# Patient Record
Sex: MALE | Race: WHITE | Employment: OTHER | URBAN - METROPOLITAN AREA
[De-identification: names, ages, dates, MRNs, and addresses within clinical notes are randomized per-mention and may not be internally consistent; named-entity substitution may affect disease eponyms.]

---

## 2023-09-27 ENCOUNTER — OFFICE VISIT (OUTPATIENT)
Dept: FAMILY MEDICINE CLINIC | Facility: CLINIC | Age: 68
End: 2023-09-27
Payer: MEDICARE

## 2023-09-27 VITALS
OXYGEN SATURATION: 96 % | TEMPERATURE: 97 F | HEART RATE: 62 BPM | DIASTOLIC BLOOD PRESSURE: 80 MMHG | SYSTOLIC BLOOD PRESSURE: 126 MMHG | RESPIRATION RATE: 18 BRPM

## 2023-09-27 DIAGNOSIS — Z11.52 ENCOUNTER FOR SCREENING FOR COVID-19: Primary | ICD-10-CM

## 2023-09-27 PROCEDURE — 99202 OFFICE O/P NEW SF 15 MIN: CPT | Performed by: NURSE PRACTITIONER

## 2023-09-27 PROCEDURE — 87635 SARS-COV-2 COVID-19 AMP PRB: CPT | Performed by: NURSE PRACTITIONER

## 2023-09-28 LAB — SARS-COV-2 RNA RESP QL NAA+PROBE: NOT DETECTED

## 2024-12-17 ENCOUNTER — LAB ENCOUNTER (OUTPATIENT)
Dept: LAB | Age: 69
End: 2024-12-17
Attending: INTERNAL MEDICINE
Payer: MEDICARE

## 2024-12-17 ENCOUNTER — TELEPHONE (OUTPATIENT)
Dept: NEUROLOGY | Facility: CLINIC | Age: 69
End: 2024-12-17

## 2024-12-17 ENCOUNTER — OFFICE VISIT (OUTPATIENT)
Dept: NEUROLOGY | Facility: CLINIC | Age: 69
End: 2024-12-17
Payer: MEDICARE

## 2024-12-17 DIAGNOSIS — E83.10 DISORDER OF IRON METABOLISM, UNSPECIFIED: ICD-10-CM

## 2024-12-17 DIAGNOSIS — I67.81 ACUTE CEREBROVASCULAR INSUFFICIENCY: ICD-10-CM

## 2024-12-17 DIAGNOSIS — R20.0 NUMBNESS AND TINGLING IN BOTH HANDS: ICD-10-CM

## 2024-12-17 DIAGNOSIS — R68.89 FORGETFULNESS: ICD-10-CM

## 2024-12-17 DIAGNOSIS — G25.81 RESTLESS LEGS SYNDROME: ICD-10-CM

## 2024-12-17 DIAGNOSIS — R20.0 NUMBNESS AND TINGLING IN BOTH HANDS: Primary | ICD-10-CM

## 2024-12-17 DIAGNOSIS — R20.2 NUMBNESS AND TINGLING IN BOTH HANDS: ICD-10-CM

## 2024-12-17 DIAGNOSIS — R20.2 NUMBNESS AND TINGLING IN BOTH HANDS: Primary | ICD-10-CM

## 2024-12-17 DIAGNOSIS — G44.85 PRIMARY STABBING HEADACHE: ICD-10-CM

## 2024-12-17 LAB
ALBUMIN SERPL-MCNC: 4.2 G/DL (ref 3.2–4.8)
ALBUMIN/GLOB SERPL: 1.4 {RATIO} (ref 1–2)
ALP LIVER SERPL-CCNC: 66 U/L
ALT SERPL-CCNC: 20 U/L
ANION GAP SERPL CALC-SCNC: 6 MMOL/L (ref 0–18)
AST SERPL-CCNC: 15 U/L (ref ?–34)
BASOPHILS # BLD AUTO: 0.06 X10(3) UL (ref 0–0.2)
BASOPHILS NFR BLD AUTO: 0.8 %
BILIRUB SERPL-MCNC: 0.7 MG/DL (ref 0.2–1.1)
BUN BLD-MCNC: 20 MG/DL (ref 9–23)
BUN/CREAT SERPL: 25.3 (ref 10–20)
CALCIUM BLD-MCNC: 9.6 MG/DL (ref 8.7–10.4)
CHLORIDE SERPL-SCNC: 106 MMOL/L (ref 98–112)
CHOLEST SERPL-MCNC: 203 MG/DL (ref ?–200)
CO2 SERPL-SCNC: 28 MMOL/L (ref 21–32)
CREAT BLD-MCNC: 0.79 MG/DL
DEPRECATED RDW RBC AUTO: 44.7 FL (ref 35.1–46.3)
EGFRCR SERPLBLD CKD-EPI 2021: 96 ML/MIN/1.73M2 (ref 60–?)
EOSINOPHIL # BLD AUTO: 0.16 X10(3) UL (ref 0–0.7)
EOSINOPHIL NFR BLD AUTO: 2.1 %
ERYTHROCYTE [DISTWIDTH] IN BLOOD BY AUTOMATED COUNT: 12.9 % (ref 11–15)
EST. AVERAGE GLUCOSE BLD GHB EST-MCNC: 105 MG/DL (ref 68–126)
FASTING PATIENT LIPID ANSWER: NO
FASTING STATUS PATIENT QL REPORTED: NO
GLOBULIN PLAS-MCNC: 2.9 G/DL (ref 2–3.5)
GLUCOSE BLD-MCNC: 66 MG/DL (ref 70–99)
HBA1C MFR BLD: 5.3 % (ref ?–5.7)
HCT VFR BLD AUTO: 44.6 %
HDLC SERPL-MCNC: 48 MG/DL (ref 40–59)
HGB BLD-MCNC: 15.5 G/DL
IMM GRANULOCYTES # BLD AUTO: 0.03 X10(3) UL (ref 0–1)
IMM GRANULOCYTES NFR BLD: 0.4 %
LDLC SERPL CALC-MCNC: 137 MG/DL (ref ?–100)
LYMPHOCYTES # BLD AUTO: 1.02 X10(3) UL (ref 1–4)
LYMPHOCYTES NFR BLD AUTO: 13.1 %
MCH RBC QN AUTO: 32.7 PG (ref 26–34)
MCHC RBC AUTO-ENTMCNC: 34.8 G/DL (ref 31–37)
MCV RBC AUTO: 94.1 FL
MONOCYTES # BLD AUTO: 0.59 X10(3) UL (ref 0.1–1)
MONOCYTES NFR BLD AUTO: 7.6 %
NEUTROPHILS # BLD AUTO: 5.9 X10 (3) UL (ref 1.5–7.7)
NEUTROPHILS # BLD AUTO: 5.9 X10(3) UL (ref 1.5–7.7)
NEUTROPHILS NFR BLD AUTO: 76 %
NONHDLC SERPL-MCNC: 155 MG/DL (ref ?–130)
OSMOLALITY SERPL CALC.SUM OF ELEC: 291 MOSM/KG (ref 275–295)
PLATELET # BLD AUTO: 222 10(3)UL (ref 150–450)
POTASSIUM SERPL-SCNC: 4.7 MMOL/L (ref 3.5–5.1)
PROT SERPL-MCNC: 7.1 G/DL (ref 5.7–8.2)
RBC # BLD AUTO: 4.74 X10(6)UL
SODIUM SERPL-SCNC: 140 MMOL/L (ref 136–145)
TRIGL SERPL-MCNC: 100 MG/DL (ref 30–149)
TSI SER-ACNC: 2.48 UIU/ML (ref 0.55–4.78)
VIT B12 SERPL-MCNC: 748 PG/ML (ref 211–911)
VLDLC SERPL CALC-MCNC: 18 MG/DL (ref 0–30)
WBC # BLD AUTO: 7.8 X10(3) UL (ref 4–11)

## 2024-12-17 PROCEDURE — 83036 HEMOGLOBIN GLYCOSYLATED A1C: CPT

## 2024-12-17 PROCEDURE — 84443 ASSAY THYROID STIM HORMONE: CPT

## 2024-12-17 PROCEDURE — 85025 COMPLETE CBC W/AUTO DIFF WBC: CPT

## 2024-12-17 PROCEDURE — 80061 LIPID PANEL: CPT

## 2024-12-17 PROCEDURE — 80053 COMPREHEN METABOLIC PANEL: CPT

## 2024-12-17 PROCEDURE — 36415 COLL VENOUS BLD VENIPUNCTURE: CPT

## 2024-12-17 PROCEDURE — 82607 VITAMIN B-12: CPT

## 2024-12-17 RX ORDER — CALCIUM CARBONATE/VITAMIN D3 600 MG-10
250 TABLET ORAL DAILY
COMMUNITY

## 2024-12-17 RX ORDER — ACETAMINOPHEN 325 MG/1
325 TABLET ORAL EVERY 6 HOURS PRN
COMMUNITY

## 2024-12-17 NOTE — PROGRESS NOTES
[facility] NEUROSCIENCES [address], [address]  [facility] [id number]  [phone]            Neurology Initial Visit     Referred By: Dr. Barraza ref. provider found    Chief Complaint:   Chief Complaint   Patient presents with    Neurologic Problem     NPT Patient presents here today to establish care, (patient does not have a PCP). Patient c/o mary legs, feet and hands numbness also sharp headaches with loss of memory changes with worsening.        HPI:     lEder Stevens is a 69 year old male, who presents for evaluation of numbness, headaches.  Has been having stabbing headaches in different locations for many years, will happen in 1 spot lasting about 10 seconds.  If he takes a few deep breaths and relaxes it will ease up.  Usually happens about once a week.  Also will have separate pain over his bilateral temples at times.    Also complaining of bilateral hand tingling which happens a lot during the daytime if he is holding his phone or using his hands in certain positions.  Hand tingling significantly flares up when he is sleeping at night.  Describes it as being equal in both hands, pain and tingling both which goes up into the arms.    Also having some tingling in the feet.  Happens only with he is sitting and resting and if he wiggles the feet it will subside.  Feels it in both feet involving the entire foot.  Has history of chronic diarrhea for 30 years.  Had colonoscopy about 30 years ago which showed polyps and he was also told to adjust his diet.  Has never gotten better and has gotten worse to the point where he has to wear diapers.  Has not seen a doctor in several decades, no cancer screening in the last 10-20 years, or screening for other illnesses.  Next    Also reporting some forgetful behavior.  Managing fine at home with bills and daily activities.  Lives with his daughter.  Sleep is overall okay.  Has significant anxiety some of which is related to financial concerns.   Drives, does not get lost, not repeating conversations.  Loses objects but not any more so than most others.    No past medical history on file.    No past surgical history on file.    Social history:  History   Smoking Status    Not on file   Smokeless Tobacco    Not on file     History   Alcohol use Not on file     History   Drug Use Not on file       No family history on file.      Current Outpatient Medications:     acetaminophen 325 MG Oral Tab, Take 1 tablet (325 mg total) by mouth every 6 (six) hours as needed for Pain., Disp: , Rfl:     cyanocobalamin 250 MCG Oral Tab, Take 1 tablet (250 mcg total) by mouth daily., Disp: , Rfl:     Allergies[1]    ROS:   As in HPI, the rest of the 14 system review was done and was negative      Physical Exam:  There were no vitals filed for this visit.    General: No apparent distress, well nourished, well groomed.  Head- Normocephalic, atraumatic  Eyes- No redness or swelling    Neurological:   Mental Status:  Mental Status- Alert, conversant, speech fluent, following all commands, Fund of knowledge appropriate for education and age, and Thought process intact    Cranial Nerves:  II.- Visual fields full to confrontation, III, IV, VI- EOM intact, V. Facial sensation intact, and VII. Face symmetric, no facial weakness    Motor Exam:  Strength- upper extremities 5/5 proximally and distally                - lower  extremities 5/5 proximally and distally    Sensory Exam:  Temperatyre- intact in all 4 extremities  Vibration- intact in all 4 extremities    Deep Tendon Reflexes:  Biceps 2+ bilateral symmetric  Triceps 2+ bilateral symmetric  Brachioradialis 2 + bilateral symmetric  Patellar 2+ bilateral symmetric  Ankle jerks 2+ bilateral symmetric    Coordination:  No dysmetria with finger to nose bilaterally    Gait:  Normal casual gait    Labs:    No results found for: \"TSH\"  No results found for: \"CHOL\", \"HDL\", \"LDL\", \"TRIG\"  No results found for: \"HGB\", \"HCT\", \"MCV\", \"WBC\",  \"ALC\", \"PLT\"   No results found for: \"GLUCOSE\", \"BUN\", \"CREAT\", \"CA\", \"ALT\", \"AST\", \"ALKPHOS\", \"ALB\", \"NA\", \"K\", \"CL\", \"CO2\"   I have reviewed labs.    Imaging Studies:  None        Assessment   Elder Stevens is a 69 year old male with no known PMH, who presents for evaluation of numbness, forgetfulness, and headaches.    1. Numbness and tingling in both hands  -Likely carpal tunnel, suggested to try nightly wrist splints  - EMG (at [facility]); Future  - A1c, TSH W REFLEX TO FREE T4 [85826][Q]; Future    2. Primary stabbing headache  -Very chronic, likely benign  - MRI BRAIN (W+WO) (CPT=70553); Future    3. Foot numbness- Likely Restless legs syndrome  - Check ferritin, especially given chronic diarrhea  - CBC W Differential W Platelet [E]; Future  - Comp Metabolic Panel (14) [E]; Future    4. Forgetfulness  -May be due to anxiety or undiagnosed systemic illness  - Vitamin B12 [E]; Future, Lipid panel  - MRI BRAIN (W+WO) (CPT=70553); Future    Gave patient several names for primary care physicians to call to establish care, stressed importance of establishing with primary care for regular screening and especially if above labs are abnormal.         Education and counseling provided to patient. Instructed patient to call my office or seek medical attention immediately if symptoms worsen.  Patient verbalized understanding of information given. All questions were answered. All side effects of drugs were discussed.     I have spent 52 min total time on the day of the encounter, including: Total time spent reasons:  Preparing to see the patient, Obtaining and/or reviewing separately obtained history, Performing a medically appropriate examination and/or evaluation, Counseling and educating the patient/family/caregiver, Ordering medications, tests, or procedures, and Documenting clinical information in Epic      Return to clinic in: Return in about 3 months (around 3/17/2025).    Penelope Archer MD         [1] No Known  Allergies

## 2024-12-17 NOTE — TELEPHONE ENCOUNTER
Pt daughter and pt called in wanting to get 2 limb emg done. Scheduled for next available April of 2025. Pls advise if okay to wait until April 2025 or if ok;d to dbl book pt to come in sooner.

## 2024-12-18 NOTE — PROGRESS NOTES
Can you please call and let him know his labs were normal except for cholesterol which is mildly elevated? For cholesterol, recommend working on improving diet- incorporate lean meat, veggies, avoid fried food, cheeses, fast food.  Also increase exercise. Can reassess when he establishes with a PCP.  Diabetes screening was negative.

## 2025-01-13 ENCOUNTER — PROCEDURE VISIT (OUTPATIENT)
Dept: NEUROLOGY | Facility: CLINIC | Age: 70
End: 2025-01-13
Payer: MEDICARE

## 2025-01-13 DIAGNOSIS — R20.2 NUMBNESS AND TINGLING IN BOTH HANDS: ICD-10-CM

## 2025-01-13 DIAGNOSIS — R20.0 NUMBNESS AND TINGLING IN BOTH HANDS: ICD-10-CM

## 2025-01-13 NOTE — PROCEDURES
[address]  [address]  [place] [address]  Phone: [phone]  Fax: [phone]    ELECTRODIAGNOSTIC REPORT          Patient: Elder Stevens Hand Dominance:  Right   Patient ID: [id number] Referring Dr:      Sex: Male Test Dr:  MD Gianni.   YOB: 1955           Visit Date: 1/13/2025 2:46 PM Examining MD:     Age: 69 Years Referred by:     Height:   Weight:     Referred for:                 Summary    The motor conduction test was performed on 4 nerve(s). The results were normal in 3 nerve(s): L Median - APB, R Ulnar - ADM, L Ulnar - ADM. Results outside the specified normal range were found in 1 nerve(s), as follows:  In the R Median - APB study  the latency was increased for Wrist stimulation    The sensory conduction test was performed on 3 nerve(s). The results were normal in 1 nerve(s): R Median, Ulnar - Transcarpal comparison. Results outside the specified normal range were found in 2 nerve(s), as follows:  In the L Median, Ulnar - Transcarpal comparison study  the peak latency difference was increased for Median Palm - Ulnar Palm segment  In the R Median, Radial - Thumb comparison study  the peak latency difference was increased for Median Wrist - Radial Wrist segment    The needle EMG examination was performed in 9 muscles. It was normal in 5 muscle(s): R. Deltoid, R. First dorsal interosseous, L. Deltoid, L. First dorsal interosseous, L. Extensor indicis proprius. The study was abnormal in 4 muscle(s), with the following distribution:  The MUP waveform abnormality was found in R. Triceps brachii, R. Flexor carpi radialis, L. Triceps brachii, L. Flexor carpi radialis.          Conclusion:       ABNORMAL STUDY      There is electrophysiologic evidence of bilateral median mononeuropathies at the wrists, which are  mild in severity. This is clinically consistent with bilateral carpal tunnel syndrome.  There is additional evidence of bilateral chronic,  mild C7 radiculopathies with no active denervation seen.    ________________________________  Penelope Archer M.D., FAAN               Motor Nerve Conduction Study       Nerve / Sites Muscle Latency Amplitude Amp Dur. Segments Distance Lat Diff Velocity     ms mV % ms  cm ms m/s   R Median - APB      Wrist APB 4.77 7.6  6.5 Wrist - APB 8        Ref.  <=4.70 >=3.8   Ref.         Elbow APB 9.15 7.5 98.3 6.3 Elbow - Wrist 22 4.38 50      Ref.      Ref.   >=47   L Median - APB      Wrist APB 4.33 7.8  5.4 Wrist - APB 8        Ref.  <=4.70 >=3.8   Ref.         Elbow APB 8.40 7.1 90.3 5.8 Elbow - Wrist 20.5 4.06 50      Ref.      Ref.   >=47   R Ulnar - ADM      Wrist ADM 2.94 8.0 100 6.9 Wrist - ADM 8        Ref.  <=3.70 >=7.9   Ref.         B.Elbow ADM 6.21 6.2 77.8 7.6 B.Elbow - Wrist 19 3.27 58      Ref.      Ref.   >=52      A.Elbow ADM 8.52 5.9 94.3 7.4 A.Elbow - B.Elbow 10 2.31 43      Ref.      Ref.   >=43   L Ulnar - ADM      Wrist ADM 3.04 8.1 100 6.5 Wrist - ADM 8        Ref.  <=3.70 >=7.9   Ref.         B.Elbow ADM 6.40 7.0 87.1 7.2 B.Elbow - Wrist 18.2 3.35 54      Ref.      Ref.   >=52      A.Elbow ADM 8.75 6.1 87.1 7.3 A.Elbow - B.Elbow 11 2.35 47      Ref.      Ref.   >=43       Sensory Nerve Conduction Study       Nerve / Sites Rec. Site Onset Lat Peak Lat NP Amp PP Amp Segments Distance Peak Diff Velocity     ms ms µV µV  cm ms m/s   R Median, Ulnar - Transcarpal comparison      Median Palm Wrist 2.08 2.66 66.4 89.9 Median Palm - Wrist 8  38      Ulnar Palm Wrist 1.67 2.34 24.6 7.2 Ulnar Palm - Wrist 8  48         Median Palm - Ulnar Palm  0.31          Ref.  <=0.40    L Median, Ulnar - Transcarpal comparison      Median Palm Wrist 1.93 2.60 70.4 59.4 Median Palm - Wrist 8  42      Ulnar Palm Wrist 1.61 2.14 13.4 19.8 Ulnar Palm - Wrist 8  50         Median Palm - Ulnar Palm  0.47          Ref.  <=0.40    R Median, Radial - Thumb comparison      Median Wrist Thumb 3.33 4.11 20.3 34.5 Median Wrist - Thumb 10   30      Radial Wrist Thumb 2.76 3.33 5.4 23.9 Radial Wrist - Thumb 10  36         Median Wrist - Radial Wrist  0.78          Ref.  <=0.50        EMG Summary Table     Spontaneous MUAP Recruitment   Muscle Nerve Roots IA Fib PSW Fasc H.F. Comments Amp Dur. PPP Pattern   R. Deltoid Axillary C5-C6 N None None None None Normal N N N N   R. Triceps brachii Radial C6-C8 N None None None None Normal N 1+ 1+ N   R. Flexor carpi radialis Median C6-C7 N None None None None Normal N 1+ 1+ N   R. First dorsal interosseous Ulnar C8-T1 N None None None None Normal N N N N   L. Deltoid Axillary C5-C6 N None None None None Normal N N N N   L. Triceps brachii Radial C6-C8 N None None None None Normal N 1+ 1+ N   L. Flexor carpi radialis Median C6-C7 N None None None None Normal 3-5 1+ N N   L. First dorsal interosseous Ulnar C8-T1 N None None None None Normal N N N N   L. Extensor indicis proprius Radial C7-C8 N None None None None Normal N N N N

## 2025-01-15 ENCOUNTER — HOSPITAL ENCOUNTER (OUTPATIENT)
Dept: MRI IMAGING | Age: 70
Discharge: HOME | End: 2025-01-15
Attending: INTERNAL MEDICINE
Payer: MEDICARE

## 2025-01-15 DIAGNOSIS — R68.89 FORGETFULNESS: ICD-10-CM

## 2025-01-15 DIAGNOSIS — G44.85 PRIMARY STABBING HEADACHE: ICD-10-CM

## 2025-01-15 PROCEDURE — 70553 MRI BRAIN STEM W/O & W/DYE: CPT | Performed by: INTERNAL MEDICINE

## 2025-01-15 PROCEDURE — A9575 INJ GADOTERATE MEGLUMI 0.1ML: HCPCS | Performed by: INTERNAL MEDICINE

## 2025-01-15 RX ORDER — GADOTERATE MEGLUMINE 376.9 MG/ML
14 INJECTION INTRAVENOUS
Status: COMPLETED | OUTPATIENT
Start: 2025-01-15 | End: 2025-01-15

## 2025-01-15 RX ADMIN — GADOTERATE MEGLUMINE 14 ML: 376.9 INJECTION INTRAVENOUS at 17:49:00

## 2025-01-17 DIAGNOSIS — D35.2 PITUITARY MACROADENOMA (HCC): Primary | ICD-10-CM

## 2025-01-17 NOTE — PROGRESS NOTES
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  This is a benign lesion that can grow in the brain, but if it gets large enough, can sometimes cause vision or hormonal changes.  We need to refer him to a neurosurgeon and ophthalmologist for further evaluation.  Also need to obtain some blood work to see if there are any hormonal changes as a result of the lesion. If those are abnormal he would need to see an endocrinologist.      As of our last visit, I do not think he had a PCP so could you also help him to find a PCP?  Will place all the referral orders right now.  Thanks!

## 2025-01-17 NOTE — PROGRESS NOTES
I also ordered a dedicated pituitary MRI.  Please stress that this is not a tumor, and may be an incidental finding.  Its not clear whether this is related to his headaches or not.

## 2025-01-20 ENCOUNTER — PATIENT MESSAGE (OUTPATIENT)
Dept: NEUROLOGY | Facility: CLINIC | Age: 70
End: 2025-01-20

## 2025-01-20 DIAGNOSIS — G56.01 CARPAL TUNNEL SYNDROME OF RIGHT WRIST: Primary | ICD-10-CM

## 2025-01-20 PROCEDURE — L3908 WHO COCK-UP NONMOLDE PRE OTS: HCPCS | Performed by: INTERNAL MEDICINE

## 2025-01-20 NOTE — TELEPHONE ENCOUNTER
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  This is a benign lesion that can grow in the brain, but if it gets large enough, can sometimes cause vision or hormonal changes.  We need to   refer him to a neurosurgeon and ophthalmologist for further evaluation.  Also need to obtain some blood work to see if there are any hormonal changes as a result of the lesion. If those are abnormal he would need to see an endocrinologist.      As of our last visit, I do not think he had a PCP so could you also help him to find a PCP?  Will place all the referral orders right now.  Thanks!

## 2025-01-20 NOTE — TELEPHONE ENCOUNTER
I called the patient and let him know of the result. Also sent him a [employer] messaging with the in the information. Patient understood and I will go ahead and schedule his appointments.    Patient did ask for a prescription for wrist splint so that his insurance can cover it.

## 2025-01-22 ENCOUNTER — HOSPITAL ENCOUNTER (OUTPATIENT)
Dept: MRI IMAGING | Age: 70
Discharge: HOME | End: 2025-01-22
Attending: INTERNAL MEDICINE
Payer: MEDICARE

## 2025-01-22 DIAGNOSIS — D35.2 PITUITARY MACROADENOMA (HCC): ICD-10-CM

## 2025-01-22 PROCEDURE — 70553 MRI BRAIN STEM W/O & W/DYE: CPT | Performed by: INTERNAL MEDICINE

## 2025-01-22 PROCEDURE — A9575 INJ GADOTERATE MEGLUMI 0.1ML: HCPCS | Performed by: INTERNAL MEDICINE

## 2025-01-22 RX ORDER — GADOTERATE MEGLUMINE 376.9 MG/ML
13 INJECTION INTRAVENOUS
Status: COMPLETED | OUTPATIENT
Start: 2025-01-22 | End: 2025-01-22

## 2025-01-22 RX ADMIN — GADOTERATE MEGLUMINE 13 ML: 376.9 INJECTION INTRAVENOUS at 19:35:00

## 2025-01-23 NOTE — PROGRESS NOTES
Can you call him and remind him to make neurosurgery appt for the pituitary mass? Was redemonstrated on MRI pituitary.  Thanks!

## 2025-03-11 ENCOUNTER — OFFICE VISIT (OUTPATIENT)
Dept: NEUROLOGY | Facility: CLINIC | Age: 70
End: 2025-03-11
Payer: MEDICARE

## 2025-03-11 ENCOUNTER — LAB ENCOUNTER (OUTPATIENT)
Dept: LAB | Facility: HOSPITAL | Age: 70
End: 2025-03-11
Attending: INTERNAL MEDICINE
Payer: MEDICARE

## 2025-03-11 VITALS — HEART RATE: 64 BPM | DIASTOLIC BLOOD PRESSURE: 77 MMHG | OXYGEN SATURATION: 97 % | SYSTOLIC BLOOD PRESSURE: 124 MMHG

## 2025-03-11 DIAGNOSIS — D35.2 PITUITARY ADENOMA (HCC): ICD-10-CM

## 2025-03-11 DIAGNOSIS — D50.8 IRON DEFICIENCY ANEMIA SECONDARY TO INADEQUATE DIETARY IRON INTAKE: Primary | ICD-10-CM

## 2025-03-11 DIAGNOSIS — D50.8 IRON DEFICIENCY ANEMIA SECONDARY TO INADEQUATE DIETARY IRON INTAKE: ICD-10-CM

## 2025-03-11 LAB
DEPRECATED HBV CORE AB SER IA-ACNC: 84 NG/ML
FSH SERPL-ACNC: 7.7 MIU/ML
IRON SATN MFR SERPL: 59 %
IRON SERPL-MCNC: 134 UG/DL
LH SERPL-ACNC: 3.7 MIU/ML
PROLACTIN SERPL-MCNC: 7.6 NG/ML
TOTAL IRON BINDING CAPACITY: 228 UG/DL (ref 250–425)
TRANSFERRIN SERPL-MCNC: 161 MG/DL (ref 215–365)

## 2025-03-11 PROCEDURE — 83001 ASSAY OF GONADOTROPIN (FSH): CPT

## 2025-03-11 PROCEDURE — 84466 ASSAY OF TRANSFERRIN: CPT

## 2025-03-11 PROCEDURE — G2211 COMPLEX E/M VISIT ADD ON: HCPCS | Performed by: INTERNAL MEDICINE

## 2025-03-11 PROCEDURE — 84305 ASSAY OF SOMATOMEDIN: CPT

## 2025-03-11 PROCEDURE — 82728 ASSAY OF FERRITIN: CPT

## 2025-03-11 PROCEDURE — 99214 OFFICE O/P EST MOD 30 MIN: CPT | Performed by: INTERNAL MEDICINE

## 2025-03-11 PROCEDURE — 83002 ASSAY OF GONADOTROPIN (LH): CPT

## 2025-03-11 PROCEDURE — 36415 COLL VENOUS BLD VENIPUNCTURE: CPT

## 2025-03-11 PROCEDURE — 83540 ASSAY OF IRON: CPT

## 2025-03-11 PROCEDURE — 84146 ASSAY OF PROLACTIN: CPT

## 2025-03-11 NOTE — PROGRESS NOTES
[facility] NEUROSCIENCES [address], [address]  [facility] [address]  [phone]            Neurology follow-up visit     Referred By: Dr. Barraza ref. provider found    Chief Complaint:   Chief Complaint   Patient presents with    Neurologic Problem     LOV 12/17/24  3 month follow up.       HPI:     Elder Stevens is a 69 year old male, who presents for follow-up of numbness, headaches.  Both are unchanged.  Had EMG where he was diagnosed with carpal tunnel but has not yet started splints for the hands.  Repositioning does seem to help relieve the pain.  Had MRI done showing pituitary adenoma but has not yet seen neurosurgery or Optho.  Denies any vision changes.    Memory is doing a little bit better, he has been working on stress management techniques which seems to be helping.    History reviewed. No pertinent past medical history.    History reviewed. No pertinent surgical history.    Social history:  History   Smoking Status    Unknown   Smokeless Tobacco    Not on file     History   Alcohol use Not on file     History   Drug Use Not on file       History reviewed. No pertinent family history.      Current Outpatient Medications:     acetaminophen 325 MG Oral Tab, Take 1 tablet (325 mg total) by mouth every 6 (six) hours as needed for Pain., Disp: , Rfl:     cyanocobalamin 250 MCG Oral Tab, Take 1 tablet (250 mcg total) by mouth daily., Disp: , Rfl:     Allergies[1]    ROS:   As in HPI, the rest of the 14 system review was done and was negative      Physical Exam:  Vitals:    03/11/25 1114   BP: 124/77   Pulse: 64   SpO2: 97%       General: No apparent distress, well nourished, well groomed.  Head- Normocephalic, atraumatic  Eyes- No redness or swelling    Neurological:   Mental Status:  Mental Status- Alert, conversant, speech fluent, following all commands, Fund of knowledge appropriate for education and age, and Thought process intact    Cranial Nerves:  II.- Visual fields full to  confrontation, III, IV, VI- EOM intact, V. Facial sensation intact, and VII. Face symmetric, no facial weakness    Motor Exam:  Strength- upper extremities 5/5 proximally and distally                - lower  extremities 5/5 proximally and distally    Sensory Exam:  Light touch- intact    Deep Tendon Reflexes:  Biceps 2+ bilateral symmetric  Triceps 2+ bilateral symmetric  Brachioradialis 2 + bilateral symmetric  Patellar 2+ bilateral symmetric  Ankle jerks 2+ bilateral symmetric    Coordination:  No dysmetria with finger to nose bilaterally    Gait:  Normal casual gait    Labs:    Lab Results   Component Value Date    TSH 2.476 12/17/2024     Lab Results   Component Value Date    HDL 48 12/17/2024     (H) 12/17/2024    TRIG 100 12/17/2024     Lab Results   Component Value Date    HGB 15.5 12/17/2024    HCT 44.6 12/17/2024    MCV 94.1 12/17/2024    WBC 7.8 12/17/2024    .0 12/17/2024      Lab Results   Component Value Date    BUN 20 12/17/2024    CA 9.6 12/17/2024    ALT 20 12/17/2024    AST 15 12/17/2024    ALB 4.2 12/17/2024     12/17/2024    K 4.7 12/17/2024     12/17/2024    CO2 28.0 12/17/2024      I have reviewed labs.    Imaging Studies:  MRI brain reviewed        Assessment   Elder Stevens is a 69 year old male with no known PMH, who presents for follow up of numbness, forgetfulness, and headaches.    1. Numbness and tingling in both hands  -EMG confirmed CTS  -Encouraged use of wrist splints    2. Primary stabbing headache  -Stable    3. Foot numbness- Likely Restless legs syndrome  - Check ferritin, TIBC, iron levels  - CBC showed no anemia  - Consider gabapentin if iron studies are normal.     4. Pituitary adenoma (HCC)  - Incidental.  Check labs as below, refer to nsgy  - FSH; Future  - IGF-1; Future  - LH (Luteinizing Hormone); Future  - Prolactin; Future  - Neurosurgery Referral - In Network       Education and counseling provided to patient. Instructed patient to call my  office or seek medical attention immediately if symptoms worsen.  Patient verbalized understanding of information given. All questions were answered. All side effects of drugs were discussed.     I have spent 31 min total time on the day of the encounter, including: Total time spent reasons:  Preparing to see the patient, Obtaining and/or reviewing separately obtained history, Performing a medically appropriate examination and/or evaluation, Counseling and educating the patient/family/caregiver, Ordering medications, tests, or procedures, and Documenting clinical information in Epic      Return to clinic in: Return in about 3 months (around 6/11/2025).    Penelope Archer MD         [1] No Known Allergies

## 2025-03-12 ENCOUNTER — OFFICE VISIT (OUTPATIENT)
Dept: SURGERY | Facility: CLINIC | Age: 70
End: 2025-03-12
Payer: MEDICARE

## 2025-03-12 DIAGNOSIS — D35.2 PITUITARY ADENOMA (HCC): Primary | ICD-10-CM

## 2025-03-12 PROCEDURE — 99203 OFFICE O/P NEW LOW 30 MIN: CPT | Performed by: NEUROLOGICAL SURGERY

## 2025-03-12 NOTE — PROGRESS NOTES
States he is having vision. States he having no other issues states he has dry skin    Reviewing imaging

## 2025-03-12 NOTE — H&P
Neurosurgery Clinic Visit  3/12/2025    Elder Stevens PCP:  None Pcp    1955 MRN [id number]       CHIEF COMPLAINT:  Chief Complaint   Patient presents with    New Patient     Pituitary adenoma       HISTORY OF PRESENT ILLNESS:  Elder Stevens is a(n) 69 year old male who presents for pituitary lesion.  Patient states he was having headaches.  He saw his PCP.  He was sent for an MRI.  MRI showed incidentally pituitary mass.  She then got an MRI of the pituitary.  He he denies any symptoms from a hormonal imbalance, he has had no visual changes.  He has not seen ophthalmology.  Is not seen endocrinology.  Overall he was doing pretty well other than his headaches.  He presents for evaluation.    REVIEW OF SYSTEMS:  The 12 point checklist was reviewed and was negative except: See above    ALLERGIES:  Allergies[1]    MEDICATIONS:  Current Outpatient Medications   Medication Sig Dispense Refill    acetaminophen 325 MG Oral Tab Take 1 tablet (325 mg total) by mouth every 6 (six) hours as needed for Pain.      cyanocobalamin 250 MCG Oral Tab Take 1 tablet (250 mcg total) by mouth daily.         HISTORY:  History reviewed. No pertinent past medical history.  History reviewed. No pertinent surgical history.  History reviewed. No pertinent family history.  Elder      PHYSICAL EXAMINATION:  Vital Signs:  There were no vitals taken for this visit.  General: The patient is in no acute distress.  HEENT: The head is atraumatic and normocephalic.  The eyes, ears, nose and throat are clear. The pupils are equal, round, and reactive to light.  Hearing is intact and symmetric.  Muscular:  Exam reveals normal bulk and tone.  Neurologic Exam: The patient is oriented to time, person and place.  Memory, attention span, language findings, and knowledge and insight into the problem appear intact and appropriate.  Cranial nerve Exam:  Visual fields are full.  The pupils are equal, round, and reactive to light.  The extraocular  movements are intact.  Facial sensation in intact.  Facial symmetry and movement of the face is intact.  Hearing appears to be intact and symmetric.  Elevation of the palate appears symmetric as well.  Shoulder shrug is intact and symmetric.  The tongue is in the midline.    Strength:     Biceps Triceps Deltoids Wrist Extension Hand Intrinsics   Left 5/5 5/5 5/5 5/5 5/5   Right 5/5 5/5 5/5 5/5 5/5      Hip Flexion Hip Adduction Hip Abduction Knee Flexion Knee Extension Plantar- flexion Dorsi- flexion EHL   Left 5/5 5/5 5/5 5/5 5/5 5/5 5/5 5/5   Right 5/5 5/5 5/5 5/5 5/5 5/5 5/5 5/5     DTRs:   Biceps Triceps Brachio Patella Ankle   Left 2+ 2+ 2+ 2+ 2+   Right 2+ 2+ 2+ 2+ 2+     Clonus:  Absent.  Babinski: Deferred  Kimble's:  Absent.  Romberg:  Negative.  Pronator drift: Absent.  Sensation:  Intact to light touch in all tested dermatomes in the upper and lower extremities.    Coordination:  Appears to be intact on finger-to-nose testing and tandem walking.  Gait and station:  Normal.  Able to heel, toe, and tandem walk.  Spine: Deferred    REVIEW OF STUDIES:  MRI from January of pituitary shows likely pituitary adenoma abutting the optic nerves      ASSESSMENT AND PLAN:  69-year-old gentleman with incidentally found pituitary adenoma  His adenoma appears asymptomatic  On my exam his visual fields are full  Would recommend ophthalmology  Referral written  Recommend endocrinology  Referral written  I ordered a new MRI for the next month  I will see him back after he seen endocrinology and ophthalmology as well as had his MRI  We reviewed the films in detail  Discussed interventions if need be  Discussed pituitary adenomas in general  Will see him back and make further plans from there  All questions were answered  Patient and family appreciative        Time spent on counseling/coordination of care:  30 Minutes    Total time spent with patient:  30 Minutes      Joseph Gerber MD   [facility]  [place]  3/12/2025  12:07 PM  Dictated but not proofread       [1] No Known Allergies

## 2025-03-12 NOTE — PATIENT INSTRUCTIONS
Refill policies:    Allow 2-3 business days for refills; controlled substances may take longer.  Contact your pharmacy at least 5 days prior to running out of medication and have them send an electronic request or submit request through the “request refill” option in your [employer] account.  Refills are not addressed on weekends; covering physicians do not authorize routine medications on weekends.  No narcotics or controlled substances are refilled after noon on Fridays or by on call physicians.  By law, narcotics must be electronically prescribed.  A 30 day supply with no refills is the maximum allowed.  If your prescription is due for a refill, you may be due for a follow up appointment.  To best provide you care, patients receiving routine medications need to be seen at least once a year.  Patients receiving narcotic/controlled substance medications need to be seen at least once every 3 months.  In the event that your preferred pharmacy does not have the requested medication in stock (e.g. Backordered), it is your responsibility to find another pharmacy that has the requested medication available.  We will gladly send a new prescription to that pharmacy at your request.    Scheduling Tests:    If your physician has ordered radiology tests such as MRI or CT scans, please contact Central Scheduling at [phone] right away to schedule the test.  Once scheduled, the [facility] Centralized Referral Team will work with your insurance carrier to obtain pre-certification or prior authorization.  Depending on your insurance carrier, approval may take 3-10 days.  It is highly recommended patients assure they have received an authorization before having a test performed.  If test is done without insurance authorization, patient may be responsible for the entire amount billed.      Precertification and Prior Authorizations:  If your physician has recommended that you have a procedure or additional testing performed the [facility]  Centralized Referral Team will contact your insurance carrier to obtain pre-certification or prior authorization.    You are strongly encouraged to contact your insurance carrier to verify that your procedure/test has been approved and is a COVERED benefit.  Although the [facility] Centralized Referral Team does its due diligence, the insurance carrier gives the disclaimer that \"Although the procedure is authorized, this does not guarantee payment.\"    Ultimately the patient is responsible for payment.   Thank you for your understanding in this matter.  Paperwork Completion:  If you require FMLA or disability paperwork for your recovery, please make sure to either drop it off or have it faxed to our office at [phone]. Be sure the form has your name and date of birth on it.  The form will be faxed to our Forms Department and they will complete it for you.  There is a 25$ fee for all forms that need to be filled out.  Please be aware there is a 10-14 day turnaround time.  You will need to sign a release of information (OBDULIO) form if your paperwork does not come with one.  You may call the Forms Department with any questions at [phone].  Their fax number is [phone].

## 2025-03-13 LAB
IGF I: 54 NG/ML
IGF-1, Z SCORE: -2.1 S.D.

## 2025-04-08 ENCOUNTER — TELEPHONE (OUTPATIENT)
Dept: GASTROENTEROLOGY | Facility: CLINIC | Age: 70
End: 2025-04-08

## 2025-04-08 ENCOUNTER — OFFICE VISIT (OUTPATIENT)
Dept: GASTROENTEROLOGY | Facility: CLINIC | Age: 70
End: 2025-04-08

## 2025-04-08 VITALS
HEIGHT: 67 IN | WEIGHT: 139 LBS | DIASTOLIC BLOOD PRESSURE: 70 MMHG | BODY MASS INDEX: 21.82 KG/M2 | SYSTOLIC BLOOD PRESSURE: 124 MMHG

## 2025-04-08 DIAGNOSIS — R19.7 DIARRHEA, UNSPECIFIED TYPE: Primary | ICD-10-CM

## 2025-04-08 PROCEDURE — 99204 OFFICE O/P NEW MOD 45 MIN: CPT | Performed by: STUDENT IN AN ORGANIZED HEALTH CARE EDUCATION/TRAINING PROGRAM

## 2025-04-08 RX ORDER — SODIUM, POTASSIUM,MAG SULFATES 17.5-3.13G
SOLUTION, RECONSTITUTED, ORAL ORAL
Qty: 1 EACH | Refills: 0 | Status: SHIPPED | OUTPATIENT
Start: 2025-04-08

## 2025-04-08 NOTE — PATIENT INSTRUCTIONS
Check labs and stool tests    1. Schedule colonoscopy with MAC [Diagnosis: diarrhea] AND EGD with MAC [diagnosis: diarrhea]    2.  bowel prep from pharmacy (split dose suprep)    3. Continue all medications for procedure    4. Read all bowel prep instructions carefully    5. AVOID seeds, nuts, popcorn, raw fruits and vegetables (cooked is okay) for 2-3 days before procedure    6. If you start any NEW medication after your visit today, please notify us. Certain medications will need to be held before the procedure, or the procedure cannot be performed.

## 2025-04-08 NOTE — H&P
[facility] - Gastroenterology                                                                                                               Reason for consult: diarrhea    Requesting physician or provider: None Pcp    Chief Complaint   Patient presents with    Consult     Frequent BMs; usually diarrhea; has to have BM right after eating; did have colonoscopy over 10 years ago      HPI:   Elder Stevens is a 69 year old year-old man with history of diarrhea who presents to GI clinic with complaint of diarrhea.    Patient reports for last 20 years he will have 3-4 episodes of diarrhea per day.  Typically he notes his symptoms are after eating.  He does not wake up in the middle the night with diarrhea.  Denies presence of blood in the toilet bowl water or on the tissue paper.  Denies abdominal pain.  Denies weight loss, loss of appetite.  He occasionally has to wear an adult diaper around because he is concerned with loss of control.    He had a colonoscopy for this many years ago and it was negative/normal per the patient.  He has tried many different therapies in the past for diarrhea but nothing helped so he never followed up.    Potassium was normal on metabolic panel from 2024.    Prior endoscopies:  Many years ago colonoscopy - normal    Soc:  -no smoking  -no Etoh    Wt Readings from Last 6 Encounters:   04/08/25 139 lb (63 kg)        History, Medications, Allergies, ROS:      History reviewed. No pertinent past medical history.   Past Surgical History:   Procedure Laterality Date    Colonoscopy        Family Hx: History reviewed. No pertinent family history.   Social History:   Social History     Socioeconomic History    Marital status:    Tobacco Use    Smoking status: Never    Smokeless tobacco: Never   Substance and Sexual Activity    Alcohol use: Not Currently        Medications (Active prior to today's  visit):  Current Outpatient Medications   Medication Sig Dispense Refill    acetaminophen 325 MG Oral Tab Take 1 tablet (325 mg total) by mouth every 6 (six) hours as needed for Pain.      cyanocobalamin 250 MCG Oral Tab Take 1 tablet (250 mcg total) by mouth daily.         Allergies:  Allergies[1]    ROS:   CONSTITUTIONAL:  negative for fevers, rigors  EYES:  negative for diplopia   RESPIRATORY:  negative for severe shortness of breath  CARDIOVASCULAR:  negative for crushing sub-sternal chest pain  GASTROINTESTINAL:  see HPI  GENITOURINARY:  negative for dysuria or gross hematuria  INTEGUMENT/BREAST:  SKIN:  negative for jaundice   ALLERGIC/IMMUNOLOGIC:  negative for hay fever  ENDOCRINE:  negative for cold intolerance and heat intolerance  MUSCULOSKELETAL:  negative for joint effusion/severe erythema  BEHAVIOR/PSYCH:  negative for psychotic behavior      PHYSICAL EXAM:   Blood pressure 124/70, height 5' 7\" (1.702 m), weight 139 lb (63 kg).    Gen- Patient appears comfortable and in no acute discomfort  HEENT: the sclera appears anicteric, oropharynx clear, mucus membranes appear moist  CV- regular rate and rhythm, the extremities are warm and well perfused   Lung- Moves air well; No labored breathing  Abdomen- soft, non-tender exam in all quadrants without rigidity or guarding, non-distended  Skin- No jaundice  Ext: no edema is evident.   Neuro- Alert and interactive, and gross movements of extremities normal  Psych - appropriate, non-agitated    Labs/Imaging:     Patient's pertinent labs and imaging were reviewed and discussed with patient today.      ASSESSMENT/PLAN:   Elder Stevens is a 69 year old year-old man with history of diarrhea who presents to GI clinic with complaint of diarrhea.    #Diarrhea  Patient with a greater than 20-year history of diarrhea with no real red flag symptoms.  Denies nocturnal episodes when sleeping, blood in the stool, weight loss or loss of appetite.  He had a colonoscopy done  for diarrhea many years ago and the result was negative/normal per the patient.  He has tried several antidiarrheals over the last 20 years without significant improvement.  His most recent set of labs showed normal potassium.  But in someone with such severe diarrhea would expect the potassium to be low though this is not the case.    The etiology is broad and unclear at this time as he has not followed up with a physician in many years to further evaluate his diarrhea.    Need to consider infectious versus inflammatory versus functional versus small bacterial overgrowth. Will also rule out celiac disease.    Recommendations:  Check labs and stool tests    1. Schedule colonoscopy with MAC [Diagnosis: diarrhea] AND EGD with MAC [diagnosis: diarrhea]    2.  bowel prep from pharmacy (split dose suprep)    Orders This Visit:  No orders of the defined types were placed in this encounter.      Meds This Visit:  Requested Prescriptions      No prescriptions requested or ordered in this encounter       Imaging & Referrals:  None     Donato Mullins MD  [facility] Gastroenterology  4/8/2025      This note was partially prepared using Dragon Medical voice recognition dictation software. As a result, errors may occur. When identified, these errors have been corrected. While every attempt is made to correct errors during dictation, discrepancies may still exist.          [1] No Known Allergies

## 2025-04-08 NOTE — TELEPHONE ENCOUNTER
Scheduled for:  Colonoscopy 13878 & EGD 05105  Provider Name:  Dr. Mullins  Date:  6/3/2025  Location:  [facility]  Sedation:  MAC  Time:  12:30 pm - Patient is aware he/she will receive a phone call the day before with the arrival time.  Prep:  Suprep  Meds/Allergies Reconciled?:  Physician reviewed  Diagnosis with codes:  Diarrhea R19.7  Was patient informed to call insurance with codes (Y/N):  Yes  Referral sent?:  Referral was sent at the time of electronic surgical scheduling.  EM or [facility] notified?:  I sent an electronic request to Endo Scheduling and received a confirmation today.    Medication Orders:  N/A  Misc Orders:  Patient is aware that his  needs to stay at the facility the entire duration of procedures.     Further instructions given by staff:  Prep instructions were given to pt in the office, pt verbalized understanding.

## 2025-04-25 ENCOUNTER — LAB ENCOUNTER (OUTPATIENT)
Dept: LAB | Age: 70
End: 2025-04-25
Attending: INTERNAL MEDICINE
Payer: MEDICARE

## 2025-04-25 DIAGNOSIS — R19.7 DIARRHEA, UNSPECIFIED TYPE: ICD-10-CM

## 2025-04-25 LAB
CRP SERPL-MCNC: <0.4 MG/DL (ref ?–0.5)
IGA SERPL-MCNC: 277.9 MG/DL (ref 40–350)

## 2025-04-25 PROCEDURE — 86364 TISS TRNSGLTMNASE EA IG CLAS: CPT

## 2025-04-25 PROCEDURE — 36415 COLL VENOUS BLD VENIPUNCTURE: CPT

## 2025-04-25 PROCEDURE — 82784 ASSAY IGA/IGD/IGG/IGM EACH: CPT

## 2025-04-25 PROCEDURE — 86140 C-REACTIVE PROTEIN: CPT

## 2025-04-29 LAB — TTG IGA SER-ACNC: 0.7 U/ML (ref ?–7)

## 2025-04-29 NOTE — PROGRESS NOTES
Celiac serologies and CRP negative.    Plan for endoscopic evaluation for diarrhea and if negative can treat with medication.

## 2025-05-03 ENCOUNTER — LAB ENCOUNTER (OUTPATIENT)
Dept: LAB | Age: 70
End: 2025-05-03
Attending: STUDENT IN AN ORGANIZED HEALTH CARE EDUCATION/TRAINING PROGRAM
Payer: MEDICARE

## 2025-05-03 DIAGNOSIS — R19.7 DIARRHEA, UNSPECIFIED TYPE: ICD-10-CM

## 2025-05-03 PROCEDURE — 87493 C DIFF AMPLIFIED PROBE: CPT

## 2025-05-03 PROCEDURE — 82656 EL-1 FECAL QUAL/SEMIQ: CPT | Performed by: STUDENT IN AN ORGANIZED HEALTH CARE EDUCATION/TRAINING PROGRAM

## 2025-05-03 PROCEDURE — 87272 CRYPTOSPORIDIUM AG IF: CPT

## 2025-05-03 PROCEDURE — 87329 GIARDIA AG IA: CPT

## 2025-05-03 PROCEDURE — 83993 ASSAY FOR CALPROTECTIN FECAL: CPT | Performed by: STUDENT IN AN ORGANIZED HEALTH CARE EDUCATION/TRAINING PROGRAM

## 2025-05-04 LAB
C DIFF TOX B STL QL: NEGATIVE
CALPROTECTIN STL-MCNT: 191 ΜG/G (ref ?–50)
CRYPTOSP AG STL QL IA: NEGATIVE
G LAMBLIA AG STL QL IA: NEGATIVE

## 2025-05-07 LAB — PANCREATIC ELAST FECAL: >800 UG ELAST./G

## 2025-05-29 ENCOUNTER — TELEPHONE (OUTPATIENT)
Dept: SURGERY | Facility: CLINIC | Age: 70
End: 2025-05-29

## 2025-05-29 NOTE — TELEPHONE ENCOUNTER
Overdue result reminder received    Pituitary MRI was ordered on 3/12/2025 by Dr. Gerber    Per office note to be done the following month and not yet completed per EMR review.    LOV:  \"ASSESSMENT AND PLAN:  69-year-old gentleman with incidentally found pituitary adenoma  His adenoma appears asymptomatic  On my exam his visual fields are full  Would recommend ophthalmology  Referral written  Recommend endocrinology  Referral written  I ordered a new MRI for the next month  I will see him back after he seen endocrinology and ophthalmology as well as had his MRI  We reviewed the films in detail  Discussed interventions if need be  Discussed pituitary adenomas in general  Will see him back and make further plans from there  All questions were answered  Patient and family appreciative\"    [employer] message sent to patient with reminder to complete scan and follow-up with Dr. Gerber after seeing endocrinology and ophthalmology.

## 2025-06-02 ENCOUNTER — TELEPHONE (OUTPATIENT)
Facility: CLINIC | Age: 70
End: 2025-06-02

## 2025-06-02 NOTE — TELEPHONE ENCOUNTER
Patient states that he is having a procedure and has questions about the instructions for the diet. Patient states that he ate fruit and vegetable a few days ago, and yesterday he ate blue berries, so he want to make sure, that it is still ok, to get the procedure done tomorrow?

## 2025-06-02 NOTE — TELEPHONE ENCOUNTER
I spoke to the pt's daughter, they were together    I let him know it is ok to proceed with the colonoscopy    He is going to start the bowel prep in about 15 minutes.    The do have the bowel prep instructions and had no further questions

## 2025-06-03 PROCEDURE — 88305 TISSUE EXAM BY PATHOLOGIST: CPT | Performed by: STUDENT IN AN ORGANIZED HEALTH CARE EDUCATION/TRAINING PROGRAM

## 2025-06-03 PROCEDURE — 88312 SPECIAL STAINS GROUP 1: CPT | Performed by: STUDENT IN AN ORGANIZED HEALTH CARE EDUCATION/TRAINING PROGRAM

## 2025-06-10 ENCOUNTER — TELEPHONE (OUTPATIENT)
Facility: CLINIC | Age: 70
End: 2025-06-10

## 2025-06-10 DIAGNOSIS — K51.311 ULCERATIVE RECTOSIGMOIDITIS WITH RECTAL BLEEDING (HCC): Primary | ICD-10-CM

## 2025-06-10 RX ORDER — PREDNISONE 10 MG/1
TABLET ORAL
Qty: 84 TABLET | Refills: 0 | Status: SHIPPED | OUTPATIENT
Start: 2025-06-10 | End: 2025-07-22

## 2025-06-30 ENCOUNTER — TELEPHONE (OUTPATIENT)
Age: 70
End: 2025-06-30